# Patient Record
Sex: FEMALE | Race: WHITE | HISPANIC OR LATINO | Employment: FULL TIME | URBAN - METROPOLITAN AREA
[De-identification: names, ages, dates, MRNs, and addresses within clinical notes are randomized per-mention and may not be internally consistent; named-entity substitution may affect disease eponyms.]

---

## 2022-02-21 ENCOUNTER — TELEPHONE (OUTPATIENT)
Dept: OBGYN CLINIC | Facility: CLINIC | Age: 42
End: 2022-02-21

## 2022-02-21 NOTE — TELEPHONE ENCOUNTER
Attempted to contact patient in regards to her appointment that she has with Dr Brooklynn Sarmiento on 2/22 at 10:30  If patient is being seen for back pain patient needs to see Sports Med or Spine and pain  Dr Brooklynn Sarmiento does not see back pain  Unable to leave a message to confirm

## 2022-02-22 ENCOUNTER — OFFICE VISIT (OUTPATIENT)
Dept: OBGYN CLINIC | Facility: CLINIC | Age: 42
End: 2022-02-22
Payer: COMMERCIAL

## 2022-02-22 VITALS
WEIGHT: 198 LBS | BODY MASS INDEX: 33.8 KG/M2 | SYSTOLIC BLOOD PRESSURE: 112 MMHG | HEIGHT: 64 IN | DIASTOLIC BLOOD PRESSURE: 72 MMHG | HEART RATE: 97 BPM

## 2022-02-22 DIAGNOSIS — M54.16 LUMBAR RADICULOPATHY: Primary | ICD-10-CM

## 2022-02-22 PROCEDURE — 99203 OFFICE O/P NEW LOW 30 MIN: CPT | Performed by: ORTHOPAEDIC SURGERY

## 2022-02-22 RX ORDER — METHOCARBAMOL 750 MG/1
TABLET, FILM COATED ORAL
COMMUNITY
Start: 2022-02-01

## 2022-02-22 RX ORDER — KETOCONAZOLE 20 MG/ML
SHAMPOO TOPICAL
COMMUNITY
Start: 2021-12-08

## 2022-02-22 RX ORDER — HYDROXYZINE HYDROCHLORIDE 25 MG/1
TABLET, FILM COATED ORAL
COMMUNITY
Start: 2022-01-25

## 2022-02-22 RX ORDER — FLUOXETINE HYDROCHLORIDE 40 MG/1
40 CAPSULE ORAL DAILY
COMMUNITY

## 2022-02-22 RX ORDER — SUMATRIPTAN 50 MG/1
TABLET, FILM COATED ORAL
COMMUNITY
Start: 2021-12-06

## 2022-02-22 RX ORDER — ALBUTEROL SULFATE 90 UG/1
2 AEROSOL, METERED RESPIRATORY (INHALATION) EVERY 6 HOURS PRN
COMMUNITY

## 2022-02-22 RX ORDER — LORATADINE 10 MG/1
TABLET ORAL
COMMUNITY
Start: 2021-12-16

## 2022-02-22 RX ORDER — METFORMIN HYDROCHLORIDE 500 MG/1
TABLET, EXTENDED RELEASE ORAL
COMMUNITY
Start: 2022-02-08

## 2022-02-22 NOTE — LETTER
February 21, 2022     Patient: Ann Wagner   YOB: 1980   Date of Visit: 2/22/2022       Liseth if patient is coming for back pain she needs to see [facility] or Dr Napoleon Aly         Sincerely,        Lore Argueta MD    CC: No Recipients

## 2022-02-22 NOTE — PROGRESS NOTES
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  Patient tells me she was diagnosed with pelvic and the hip the stress fractures which to 2 years to heal pain is then she has had difficulty ambulating and the pain in the low back radiates to the right groin in the right foot  She has been treated at the [facility]   She presents for evaluation with the x-rays and MRIs of the spine lumbosacral spine      Current Medications  Current Outpatient Medications   Medication Sig Dispense Refill    albuterol (PROVENTIL HFA,VENTOLIN HFA) 90 mcg/act inhaler Inhale 2 puffs every 6 (six) hours as needed for wheezing      Diclofenac Sodium (VOLTAREN) 1 %       FLUoxetine (PROzac) 40 MG capsule Take 40 mg by mouth daily      fluticasone (FLOVENT DISKUS) 50 MCG/BLIST diskus inhaler Inhale 1 puff 2 (two) times a day Rinse mouth after use   HM LIDOCAINE PATCH EX Apply topically      hydrOXYzine HCL (ATARAX) 25 mg tablet       ketoconazole (NIZORAL) 2 % shampoo       loratadine (CLARITIN) 10 mg tablet       metFORMIN (GLUCOPHAGE-XR) 500 mg 24 hr tablet       methocarbamol (ROBAXIN) 750 mg tablet       SUMAtriptan (IMITREX) 50 mg tablet        No current facility-administered medications for this visit  Current Problems    Active Problems: There are no problems to display for this patient          Review of Systems:    General: negative for - chills, fatigue, fever,  weight gain or weight loss  Psychological: negative for - anxiety, behavioral disorder, concentration difficulties  Ophthalmic: negative for - blurry vision, decreased vision, double vision,      Past Medical History:   Past Medical History:   Diagnosis Date    Anxiety     Asthma     Diabetes mellitus ([address] )        Past Surgical History:   History reviewed  No pertinent surgical history  Family History:  Family history reviewed and non-contributory  No family history on file      Social History:  Social History     Socioeconomic History    Marital status: /Civil Union     Spouse name: None    Number of children: None    Years of education: None    Highest education level: None   Occupational History    None   Tobacco Use    Smoking status: Never Smoker    Smokeless tobacco: Never Used   Vaping Use    Vaping Use: Never used   Substance and Sexual Activity    Alcohol use: Yes     Comment: socially    Drug use: Never    Sexual activity: None   Other Topics Concern    None   Social History Narrative    None     Social Determinants of Health     Financial Resource Strain: Not on file   Food Insecurity: Not on file   Transportation Needs: Not on file   Physical Activity: Not on file   Stress: Not on file   Social Connections: Not on file   Intimate Partner Violence: Not on file   Housing Stability: Not on file       Allergies:   No Known Allergies        Physical ExaminationBP 112/72 (BP Location: Left arm, Patient Position: Sitting, Cuff Size: Standard)   Pulse 97   Ht 5' 4" (1 626 m)   Wt 89 8 kg (198 lb)   BMI 33 99 kg/m²   Gen: Alert and oriented to person, place, time  HEENT: EOMI, eyes clear, moist mucus membranes, hearing intact      Orthopedic Exam  Mild spasm of the lumbosacral spinal  Muscles especially paraspinal with mild tenderness straight leg raise is around 40° bilaterally negative stress test grade 5  Motor power in all muscle groups hip rotations in extensions completely pain-free but and flexion of the hip causes discomfort in the right groin  Reviewed MRI of the hip which is essentially normal done at the [facility] MRI of the lumbosacral spine shows multilevel DJD with possible left-sided disc prolapse          Impression  Back pain with the radiation to both lower limbs right more than left        Plan    Discussed treatment with the patient is aware of the importance of weight loss, regular exercise, stretching, over-the-counter  Pain medication and physical therapy patient referred  To physical therapy and pain management follow-up here benoit Delaney MD        Portions of the record may have been created with voice recognition software  Occasional wrong word or "sound a like" substitutions may have occurred due to the inherent limitations of voice recognition software  Read the chart carefully and recognize, using context, where substitutions have occurred

## 2022-03-04 ENCOUNTER — TELEPHONE (OUTPATIENT)
Dept: PAIN MEDICINE | Facility: CLINIC | Age: 42
End: 2022-03-04

## 2022-03-04 ENCOUNTER — OFFICE VISIT (OUTPATIENT)
Dept: PAIN MEDICINE | Facility: CLINIC | Age: 42
End: 2022-03-04
Payer: COMMERCIAL

## 2022-03-04 VITALS
HEART RATE: 88 BPM | HEIGHT: 64 IN | DIASTOLIC BLOOD PRESSURE: 74 MMHG | WEIGHT: 196.4 LBS | SYSTOLIC BLOOD PRESSURE: 118 MMHG | BODY MASS INDEX: 33.53 KG/M2

## 2022-03-04 DIAGNOSIS — M22.8X1 PATELLAR TRACKING DISORDER OF RIGHT KNEE: ICD-10-CM

## 2022-03-04 DIAGNOSIS — M46.1 SACROILIITIS (HCC): Primary | ICD-10-CM

## 2022-03-04 DIAGNOSIS — M51.36 LUMBAR DEGENERATIVE DISC DISEASE: ICD-10-CM

## 2022-03-04 DIAGNOSIS — M54.16 LUMBAR RADICULOPATHY: ICD-10-CM

## 2022-03-04 PROBLEM — M51.369 LUMBAR DEGENERATIVE DISC DISEASE: Status: ACTIVE | Noted: 2022-03-04

## 2022-03-04 PROCEDURE — 99204 OFFICE O/P NEW MOD 45 MIN: CPT | Performed by: ANESTHESIOLOGY

## 2022-03-04 NOTE — LETTER
March 4, 2022     Louie Cota MD  [address] [place] [id number]    Patient: Horacio Gimenez   YOB: 1980   Date of Visit: 3/4/2022       Dear Dr Carson Nielsen:    Thank you for referring Jerilyn Vigil to me for evaluation  Below are my notes for this consultation  If you have questions, please do not hesitate to call me  I look forward to following your patient along with you  Sincerely,        Valentin Lacy MD        CC: No Recipients  Valentin Lacy MD  3/4/2022  8:56 AM  Signed  Assessment:  1  Sacroiliitis ([address] )    2  Lumbar radiculopathy    3  Lumbar degenerative disc disease        Plan:  Patient is a 28-year-old female complaints of low back pain, bilateral hip pain, right knee pain, right ankle pain with chronic pain syndrome secondary to sacroiliitis presents office for initial consultation  MRI of lumbar spine was reviewed which showed degenerative disc changes at L5-S1 and L4-5 with foraminal narrowing on the left side  For physical exam and patient history appears that patient is suffer from sacroiliitis with exacerbation from sitting from standing position, standing from sitting position, leaning forward to pick objects off the table, sitting for any prolonged period of time  Patient denies ability to lie on her right side secondary to exacerbation of pain  Patient does report pain can radiate into the groin  Patient also show signs of 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  1  We will schedule patient for a bilateral sacroiliac joint steroid injection  2  We will discontinue Mobic and trial diclofenac 50 mg p o  B i d  For arthritic pain sacroiliac joint pain   3  Follow-up 1 month after injection    [place] Prescription Drug Monitoring Program report was reviewed and was appropriate     Complete risks and benefits including bleeding, infection, tissue reaction, nerve injury and allergic reaction were discussed   The approach was demonstrated using models and literature was provided  Verbal and written consent was obtained  History of Present Illness: The patient is a 43 y o  female who presents for consultation in regards to Hip Pain, Back Pain, Groin Pain, Knee Pain, and Foot Pain  Symptoms have been present for 2 years  Symptoms began without any precipitating injury or trauma  Pain is reported to be 10 on the numeric rating scale  Symptoms are felt constantly and worst in the nighttime  Symptoms are characterized as burning, sharp, numbing, tingling, pressure-like and throbbing  Symptoms are associated with right leg weakness  Aggravating factors include lying down, standing, bending, leaning forward, leaning bckward, sitting, walking and exercise  Relieving factors include relaxation  No change in symptoms with kneeling, coughing/sneezing and bowel movements  Treatments that have been helpful include TENS unit and heat/ice  physical therapy and chiropractic manipulation have provided no relief  Medications to relieve symptoms include Mobic, Lidoderm without relief  Review of Systems:    Review of Systems   Constitutional: Positive for unexpected weight change  Negative for fever  HENT: Negative for trouble swallowing  Eyes: Negative for visual disturbance  Respiratory: Negative for shortness of breath and wheezing  Cardiovascular: Positive for leg swelling  Negative for chest pain and palpitations  Gastrointestinal: Negative for constipation, diarrhea, nausea and vomiting  Endocrine: Positive for polydipsia  Negative for cold intolerance and heat intolerance  Genitourinary: Negative for difficulty urinating and frequency  Musculoskeletal: Positive for arthralgias, joint swelling and myalgias  Negative for gait problem  Skin: Negative for rash  Neurological: Negative for dizziness, seizures, syncope, weakness and headaches  Hematological: Does not bruise/bleed easily     Psychiatric/Behavioral: Negative for dysphoric mood  The patient is nervous/anxious  All other systems reviewed and are negative  Past Medical History:   Diagnosis Date    Anxiety     Asthma     Diabetes mellitus ([address] )        No past surgical history on file  No family history on file  Social History     Occupational History    Not on file   Tobacco Use    Smoking status: Never Smoker    Smokeless tobacco: Never Used   Vaping Use    Vaping Use: Never used   Substance and Sexual Activity    Alcohol use: Yes     Comment: socially    Drug use: Never    Sexual activity: Not on file         Current Outpatient Medications:     albuterol (PROVENTIL HFA,VENTOLIN HFA) 90 mcg/act inhaler, Inhale 2 puffs every 6 (six) hours as needed for wheezing, Disp: , Rfl:     Diclofenac Sodium (VOLTAREN) 1 %, , Disp: , Rfl:     FLUoxetine (PROzac) 40 MG capsule, Take 40 mg by mouth daily, Disp: , Rfl:     fluticasone (FLOVENT DISKUS) 50 MCG/BLIST diskus inhaler, Inhale 1 puff 2 (two) times a day Rinse mouth after use , Disp: , Rfl:     HM LIDOCAINE PATCH EX, Apply topically, Disp: , Rfl:     hydrOXYzine HCL (ATARAX) 25 mg tablet, , Disp: , Rfl:     ketoconazole (NIZORAL) 2 % shampoo, , Disp: , Rfl:     loratadine (CLARITIN) 10 mg tablet, , Disp: , Rfl:     metFORMIN (GLUCOPHAGE-XR) 500 mg 24 hr tablet, , Disp: , Rfl:     methocarbamol (ROBAXIN) 750 mg tablet, , Disp: , Rfl:     SUMAtriptan (IMITREX) 50 mg tablet, , Disp: , Rfl:     No Known Allergies    Physical Exam:    /74 (BP Location: Left arm, Patient Position: Sitting, Cuff Size: Standard)   Pulse 88   Ht 5' 4" (1 626 m)   Wt 89 1 kg (196 lb 6 4 oz)   BMI 33 71 kg/m²     Constitutional: normal, well developed, well nourished, alert, in no distress and non-toxic and no overt pain behavior   and obese  Eyes: anicteric  HEENT: grossly intact  Neck: supple, symmetric, trachea midline and no masses   Pulmonary:even and unlabored  Cardiovascular:No edema or pitting edema present  Skin:Normal without rashes or lesions and well hydrated  Psychiatric:Mood and affect appropriate  Neurologic:Cranial Nerves II-XII grossly intact  Musculoskeletal:normal, positive Camilo's, positive Gaenslen, positive tender to palpation sacroiliac joint bilaterally;     Imaging  No orders to display       No orders of the defined types were placed in this encounter

## 2022-03-04 NOTE — PROGRESS NOTES
Assessment:  1  Sacroiliitis ([facility] [address] )    2  Lumbar radiculopathy    3  Lumbar degenerative disc disease        Plan:  Patient is a 45-year-old female complaints of low back pain, bilateral hip pain, right knee pain, right ankle pain with chronic pain syndrome secondary to sacroiliitis presents office for initial consultation  MRI of lumbar spine was reviewed which showed degenerative disc changes at L5-S1 and L4-5 with foraminal narrowing on the left side  For physical exam and patient history appears that patient is suffer from sacroiliitis with exacerbation from sitting from standing position, standing from sitting position, leaning forward to pick objects off the table, sitting for any prolonged period of time  Patient denies ability to lie on her right side secondary to exacerbation of pain  Patient does report pain can radiate into the groin  Patient also show signs of 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  1  We will schedule patient for a bilateral sacroiliac joint steroid injection  2  We will discontinue Mobic and trial diclofenac 50 mg p o  B i d  For arthritic pain sacroiliac joint pain   3  Follow-up 1 month after injection    [facility] Prescription Drug Monitoring Program report was reviewed and was appropriate     Complete risks and benefits including bleeding, infection, tissue reaction, nerve injury and allergic reaction were discussed  The approach was demonstrated using models and literature was provided  Verbal and written consent was obtained  History of Present Illness: The patient is a 43 y o  female who presents for consultation in regards to Hip Pain, Back Pain, Groin Pain, Knee Pain, and Foot Pain  Symptoms have been present for 2 years  Symptoms began without any precipitating injury or trauma  Pain is reported to be 10 on the numeric rating scale  Symptoms are felt constantly and worst in the nighttime    Symptoms are characterized as burning, sharp, numbing, tingling, pressure-like and throbbing  Symptoms are associated with right leg weakness  Aggravating factors include lying down, standing, bending, leaning forward, leaning bckward, sitting, walking and exercise  Relieving factors include relaxation  No change in symptoms with kneeling, coughing/sneezing and bowel movements  Treatments that have been helpful include TENS unit and heat/ice  physical therapy and chiropractic manipulation have provided no relief  Medications to relieve symptoms include Mobic, Lidoderm without relief  Review of Systems:    Review of Systems   Constitutional: Positive for unexpected weight change  Negative for fever  HENT: Negative for trouble swallowing  Eyes: Negative for visual disturbance  Respiratory: Negative for shortness of breath and wheezing  Cardiovascular: Positive for leg swelling  Negative for chest pain and palpitations  Gastrointestinal: Negative for constipation, diarrhea, nausea and vomiting  Endocrine: Positive for polydipsia  Negative for cold intolerance and heat intolerance  Genitourinary: Negative for difficulty urinating and frequency  Musculoskeletal: Positive for arthralgias, joint swelling and myalgias  Negative for gait problem  Skin: Negative for rash  Neurological: Negative for dizziness, seizures, syncope, weakness and headaches  Hematological: Does not bruise/bleed easily  Psychiatric/Behavioral: Negative for dysphoric mood  The patient is nervous/anxious  All other systems reviewed and are negative  Past Medical History:   Diagnosis Date    Anxiety     Asthma     Diabetes mellitus ([facility] [address] )        No past surgical history on file  No family history on file      Social History     Occupational History    Not on file   Tobacco Use    Smoking status: Never Smoker    Smokeless tobacco: Never Used   Vaping Use    Vaping Use: Never used   Substance and Sexual Activity    Alcohol use: Yes     Comment: socially  Drug use: Never    Sexual activity: Not on file         Current Outpatient Medications:     albuterol (PROVENTIL HFA,VENTOLIN HFA) 90 mcg/act inhaler, Inhale 2 puffs every 6 (six) hours as needed for wheezing, Disp: , Rfl:     Diclofenac Sodium (VOLTAREN) 1 %, , Disp: , Rfl:     FLUoxetine (PROzac) 40 MG capsule, Take 40 mg by mouth daily, Disp: , Rfl:     fluticasone (FLOVENT DISKUS) 50 MCG/BLIST diskus inhaler, Inhale 1 puff 2 (two) times a day Rinse mouth after use , Disp: , Rfl:     HM LIDOCAINE PATCH EX, Apply topically, Disp: , Rfl:     hydrOXYzine HCL (ATARAX) 25 mg tablet, , Disp: , Rfl:     ketoconazole (NIZORAL) 2 % shampoo, , Disp: , Rfl:     loratadine (CLARITIN) 10 mg tablet, , Disp: , Rfl:     metFORMIN (GLUCOPHAGE-XR) 500 mg 24 hr tablet, , Disp: , Rfl:     methocarbamol (ROBAXIN) 750 mg tablet, , Disp: , Rfl:     SUMAtriptan (IMITREX) 50 mg tablet, , Disp: , Rfl:     No Known Allergies    Physical Exam:    /74 (BP Location: Left arm, Patient Position: Sitting, Cuff Size: Standard)   Pulse 88   Ht 5' 4" (1 626 m)   Wt 89 1 kg (196 lb 6 4 oz)   BMI 33 71 kg/m²     Constitutional: normal, well developed, well nourished, alert, in no distress and non-toxic and no overt pain behavior  and obese  Eyes: anicteric  HEENT: grossly intact  Neck: supple, symmetric, trachea midline and no masses   Pulmonary:even and unlabored  Cardiovascular:No edema or pitting edema present  Skin:Normal without rashes or lesions and well hydrated  Psychiatric:Mood and affect appropriate  Neurologic:Cranial Nerves II-XII grossly intact  Musculoskeletal:normal, positive Camilo's, positive Gaenslen, positive tender to palpation sacroiliac joint bilaterally;     Imaging  No orders to display       No orders of the defined types were placed in this encounter

## 2022-03-04 NOTE — PATIENT INSTRUCTIONS
Patellar Tracking Syndrome    Sacroiliac Joint Injection      What is the sacroiliac joint and why is a sacroiliac joint injection helpful? The sacroiliac joint is a large joint in the region of your low back and buttocks  When the joint becomes painful it can cause pain in its immediate region or it can refer pain into your groin, abdomen or leg  A sacroiliac joint injection serves several purposes  First, by placing numbing medicine into the joint, the amount of immediate pain relief you experience will help confirm or deny the joint as a source of your pain  Additionally, the temporary pain relief of the numbing medicine may better allow a physical therapist to treat the joint  Also, steroid will serve to reduce any presumed inflammation within your joint and further assist the physical therapist or chiropractor, if necessary  The procedure serves both diagnostic and therapeutic purposes  What happens during the procedure? Lying on your stomach, the skin on your buttocks will be well cleaned  The physician will numb a small area of skin which stings for a few seconds  Next, the physician will use X-ray guidance to direct a very small needle into the joint, and then he will inject several drops of contrast dye to confirm that the medicine goes into the joint  Then, a small amount of numbing medicine and anti-inflammatory steroid will be slowly injected  What happens after the procedure? A dressing may be applied to the injection site  You will remain in the office for about 15-20 minutes and the nurse will monitor your blood pressure and pulse  The nurse will review your discharge instructions and you will be able to go home  You may experience numbness or weakness to the affected limb for a few hours after the procedure  If this happens do not walk without assistance  Your physician may refer you to a physical therapist while the anti-inflammatory steroid is still working      General Pre/Post Instructions  Eating  You may eat a light, but not full meal at least one hour before the procedure, unless receiving intravenous sedation  If you are an insulin dependent diabetic do not alter your normal food intake  Medications  Take your routine medications before the procedure (such as high blood pressure and diabetes medications) except for those that need to be discontinued five days before the procedure such as aspirin and all anti-inflammatory medications (e g  Motrin/Ibuprofen, Aleve, Relafen, Daypro)  These medicines may be re-started the day after the procedure  You may take your regular pain medicine as needed before/after the procedure  If you are taking Coumadin, Heparin, Lovenox, Plavix or Ticlid you must notify the office so that the timing of stopping these medications can be explained  Exercise  You must bring a  with you  You may return to your current level of activities the next day including return to work  Things that may Delay the Procedure  If you are on antibiotics please notify our office; we may delay the procedure  If you have an active infection or fever we will not do the procedure

## 2022-03-04 NOTE — H&P (VIEW-ONLY)
Assessment:  1  Sacroiliitis ([facility] [address] )    2  Lumbar radiculopathy    3  Lumbar degenerative disc disease        Plan:  Patient is a 25-year-old female complaints of low back pain, bilateral hip pain, right knee pain, right ankle pain with chronic pain syndrome secondary to sacroiliitis presents office for initial consultation  MRI of lumbar spine was reviewed which showed degenerative disc changes at L5-S1 and L4-5 with foraminal narrowing on the left side  For physical exam and patient history appears that patient is suffer from sacroiliitis with exacerbation from sitting from standing position, standing from sitting position, leaning forward to pick objects off the table, sitting for any prolonged period of time  Patient denies ability to lie on her right side secondary to exacerbation of pain  Patient does report pain can radiate into the groin  Patient also show signs of 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  1  We will schedule patient for a bilateral sacroiliac joint steroid injection  2  We will discontinue Mobic and trial diclofenac 50 mg p o  B i d  For arthritic pain sacroiliac joint pain   3  Follow-up 1 month after injection    [place] Prescription Drug Monitoring Program report was reviewed and was appropriate     Complete risks and benefits including bleeding, infection, tissue reaction, nerve injury and allergic reaction were discussed  The approach was demonstrated using models and literature was provided  Verbal and written consent was obtained  History of Present Illness: The patient is a 43 y o  female who presents for consultation in regards to Hip Pain, Back Pain, Groin Pain, Knee Pain, and Foot Pain  Symptoms have been present for 2 years  Symptoms began without any precipitating injury or trauma  Pain is reported to be 10 on the numeric rating scale  Symptoms are felt constantly and worst in the nighttime    Symptoms are characterized as burning, sharp, numbing, tingling, pressure-like and throbbing  Symptoms are associated with right leg weakness  Aggravating factors include lying down, standing, bending, leaning forward, leaning bckward, sitting, walking and exercise  Relieving factors include relaxation  No change in symptoms with kneeling, coughing/sneezing and bowel movements  Treatments that have been helpful include TENS unit and heat/ice  physical therapy and chiropractic manipulation have provided no relief  Medications to relieve symptoms include Mobic, Lidoderm without relief  Review of Systems:    Review of Systems   Constitutional: Positive for unexpected weight change  Negative for fever  HENT: Negative for trouble swallowing  Eyes: Negative for visual disturbance  Respiratory: Negative for shortness of breath and wheezing  Cardiovascular: Positive for leg swelling  Negative for chest pain and palpitations  Gastrointestinal: Negative for constipation, diarrhea, nausea and vomiting  Endocrine: Positive for polydipsia  Negative for cold intolerance and heat intolerance  Genitourinary: Negative for difficulty urinating and frequency  Musculoskeletal: Positive for arthralgias, joint swelling and myalgias  Negative for gait problem  Skin: Negative for rash  Neurological: Negative for dizziness, seizures, syncope, weakness and headaches  Hematological: Does not bruise/bleed easily  Psychiatric/Behavioral: Negative for dysphoric mood  The patient is nervous/anxious  All other systems reviewed and are negative  Past Medical History:   Diagnosis Date    Anxiety     Asthma     Diabetes mellitus ([facility] [address] )        No past surgical history on file  No family history on file      Social History     Occupational History    Not on file   Tobacco Use    Smoking status: Never Smoker    Smokeless tobacco: Never Used   Vaping Use    Vaping Use: Never used   Substance and Sexual Activity    Alcohol use: Yes     Comment: socially  Drug use: Never    Sexual activity: Not on file         Current Outpatient Medications:     albuterol (PROVENTIL HFA,VENTOLIN HFA) 90 mcg/act inhaler, Inhale 2 puffs every 6 (six) hours as needed for wheezing, Disp: , Rfl:     Diclofenac Sodium (VOLTAREN) 1 %, , Disp: , Rfl:     FLUoxetine (PROzac) 40 MG capsule, Take 40 mg by mouth daily, Disp: , Rfl:     fluticasone (FLOVENT DISKUS) 50 MCG/BLIST diskus inhaler, Inhale 1 puff 2 (two) times a day Rinse mouth after use , Disp: , Rfl:     HM LIDOCAINE PATCH EX, Apply topically, Disp: , Rfl:     hydrOXYzine HCL (ATARAX) 25 mg tablet, , Disp: , Rfl:     ketoconazole (NIZORAL) 2 % shampoo, , Disp: , Rfl:     loratadine (CLARITIN) 10 mg tablet, , Disp: , Rfl:     metFORMIN (GLUCOPHAGE-XR) 500 mg 24 hr tablet, , Disp: , Rfl:     methocarbamol (ROBAXIN) 750 mg tablet, , Disp: , Rfl:     SUMAtriptan (IMITREX) 50 mg tablet, , Disp: , Rfl:     No Known Allergies    Physical Exam:    /74 (BP Location: Left arm, Patient Position: Sitting, Cuff Size: Standard)   Pulse 88   Ht 5' 4" (1 626 m)   Wt 89 1 kg (196 lb 6 4 oz)   BMI 33 71 kg/m²     Constitutional: normal, well developed, well nourished, alert, in no distress and non-toxic and no overt pain behavior  and obese  Eyes: anicteric  HEENT: grossly intact  Neck: supple, symmetric, trachea midline and no masses   Pulmonary:even and unlabored  Cardiovascular:No edema or pitting edema present  Skin:Normal without rashes or lesions and well hydrated  Psychiatric:Mood and affect appropriate  Neurologic:Cranial Nerves II-XII grossly intact  Musculoskeletal:normal, positive Camilo's, positive Gaenslen, positive tender to palpation sacroiliac joint bilaterally;     Imaging  No orders to display       No orders of the defined types were placed in this encounter

## 2022-03-25 ENCOUNTER — APPOINTMENT (OUTPATIENT)
Dept: RADIOLOGY | Facility: HOSPITAL | Age: 42
End: 2022-03-25
Payer: COMMERCIAL

## 2022-03-25 ENCOUNTER — HOSPITAL ENCOUNTER (OUTPATIENT)
Facility: HOSPITAL | Age: 42
Setting detail: OUTPATIENT SURGERY
Discharge: HOME | End: 2022-03-25
Attending: ANESTHESIOLOGY | Admitting: ANESTHESIOLOGY
Payer: COMMERCIAL

## 2022-03-25 VITALS
TEMPERATURE: 98.4 F | SYSTOLIC BLOOD PRESSURE: 101 MMHG | DIASTOLIC BLOOD PRESSURE: 69 MMHG | OXYGEN SATURATION: 97 % | HEART RATE: 69 BPM | RESPIRATION RATE: 18 BRPM

## 2022-03-25 DIAGNOSIS — M46.1 SACROILIITIS (HCC): ICD-10-CM

## 2022-03-25 PROCEDURE — 27096 INJECT SACROILIAC JOINT: CPT | Performed by: ANESTHESIOLOGY

## 2022-03-25 RX ORDER — METHYLPREDNISOLONE ACETATE 40 MG/ML
INJECTION, SUSPENSION INTRA-ARTICULAR; INTRALESIONAL; INTRAMUSCULAR; SOFT TISSUE AS NEEDED
Status: DISCONTINUED | OUTPATIENT
Start: 2022-03-25 | End: 2022-03-25 | Stop reason: HOSPADM

## 2022-03-25 RX ORDER — BUPIVACAINE HYDROCHLORIDE 2.5 MG/ML
INJECTION, SOLUTION EPIDURAL; INFILTRATION; INTRACAUDAL AS NEEDED
Status: DISCONTINUED | OUTPATIENT
Start: 2022-03-25 | End: 2022-03-25 | Stop reason: HOSPADM

## 2022-03-25 RX ORDER — LIDOCAINE HYDROCHLORIDE 10 MG/ML
INJECTION, SOLUTION EPIDURAL; INFILTRATION; INTRACAUDAL; PERINEURAL AS NEEDED
Status: DISCONTINUED | OUTPATIENT
Start: 2022-03-25 | End: 2022-03-25 | Stop reason: HOSPADM

## 2022-03-25 NOTE — DISCHARGE INSTRUCTIONS

## 2022-03-25 NOTE — INTERVAL H&P NOTE
H&P reviewed  After examining the patient I find no changes in the patients condition since the H&P had been written      Vitals:    03/25/22 0721   BP: 121/80   Pulse: 81   Resp: 18   Temp: 98 4 °F (36 9 °C)   SpO2: 97%

## 2022-03-25 NOTE — OP NOTE
OPERATIVE REPORT  PATIENT NAME: Ruthy Sotelo    :  1980  MRN: [id number]  Pt Location: [facility] OR ROOM 01    SURGERY DATE: 3/25/2022    Surgeon(s) and Role:     * Clarence Omer MD - Primary    Preop Diagnosis:  Sacroiliitis ([address] ) [M46 1]    Post-Op Diagnosis Codes:     * Sacroiliitis ([address] ) [M46 1]    Procedure(s) (LRB):  Bilateral sacroiliac joint steroid injection (Bilateral)    Specimen(s):  * No specimens in log *    Estimated Blood Loss:   Minimal    Drains:  * No LDAs found *    Anesthesia Type:   Local    Operative Indications:  Sacroiliitis ([address] ) [M46 1]      Operative Findings:  same    Complications:   None    Procedure and Technique:  Fluoroscopically-guided injection of the Bilateral sacroiliac joint(s)    After discussing the risks, benefits, and alternatives to the procedure, the patient expressed understanding and wished to proceed  The patient was brought to the fluoroscopy suite and placed in the prone position  Procedural pause conducted to verify:  correct patient identity, procedure to be performed and as applicable, correct side and site, correct patient position, and availability of implants, special equipment and special requirements  Using fluoroscopy, the inferior portion of the left sacroiliac joint was identified  The skin was sterilely prepped and draped in the usual fashion using Chloraprep skin prep  The skin and subcutaneous tissue were anesthetized with 0 5% lidocaine  Using fluoroscopic guidance, a 3 5 inch 22 gauge spinal needle was advanced into joint  Proper needle positioning was confirmed using multiple fluoroscopic views  After negative aspiration, 0 5 mL Omnipaque 300 contrast was injected, showing intraarticular spread of contrast without any evidence of intravascular uptake  A 2 5 mL solution consisting of 40 mg of Depo-Medrol in 0 25% bupivacaine was injected slowly and incrementally into the joint    Following the injection, the needle was withdrawn slightly and flushed with lidocaine as it was fully extracted  The procedure was repeated in the exact same way on the opposite side  The patient tolerated the procedure well and there were no apparent complications  After appropriate observation, the patient was dismissed from the clinic in good condition under their own power  COMMENTS  The patient received a total steroid dose of 80 mg     I was present for the entire procedure    Patient Disposition:  hemodynamically stable      SIGNATURE: Rain Evans MD  DATE: March 25, 2022  TIME: 8:55 AM

## 2022-03-30 ENCOUNTER — TELEPHONE (OUTPATIENT)
Dept: PAIN MEDICINE | Facility: CLINIC | Age: 42
End: 2022-03-30

## 2022-04-04 NOTE — TELEPHONE ENCOUNTER
Please see previous task.    S/W pt.  Pt stated she is feeling really tired and fatigue since her injection.  It has been staying the same since the injection.  Her period came 10 days early and she is always regular.  Pt asking if these things are related to the injection?  Please advise.

## 2022-04-04 NOTE — TELEPHONE ENCOUNTER
Pain level is a 6/10 as now  The injection worked about 60% as now. The pressure is not as bad as before.     Please have a nurse follow up the pt, she has a few questions.    -She would like to know if it is normal for her to feel tried after her injection?    -Is it possible her her cycle to change after having the procedure?

## 2022-04-05 NOTE — TELEPHONE ENCOUNTER
Left a detailed message as per release of health information, advising pt the same.  C/B # provided for any questions.

## 2022-05-03 ENCOUNTER — OFFICE VISIT (OUTPATIENT)
Dept: PAIN MEDICINE | Facility: CLINIC | Age: 42
End: 2022-05-03
Payer: COMMERCIAL

## 2022-05-03 VITALS
SYSTOLIC BLOOD PRESSURE: 119 MMHG | HEART RATE: 81 BPM | BODY MASS INDEX: 33.63 KG/M2 | HEIGHT: 64 IN | WEIGHT: 197 LBS | DIASTOLIC BLOOD PRESSURE: 75 MMHG

## 2022-05-03 DIAGNOSIS — M51.36 LUMBAR DEGENERATIVE DISC DISEASE: ICD-10-CM

## 2022-05-03 DIAGNOSIS — M46.1 SACROILIITIS (HCC): ICD-10-CM

## 2022-05-03 DIAGNOSIS — G89.29 CHRONIC BILATERAL LOW BACK PAIN WITHOUT SCIATICA: ICD-10-CM

## 2022-05-03 DIAGNOSIS — G89.4 CHRONIC PAIN SYNDROME: Primary | ICD-10-CM

## 2022-05-03 DIAGNOSIS — M54.50 CHRONIC BILATERAL LOW BACK PAIN WITHOUT SCIATICA: ICD-10-CM

## 2022-05-03 PROCEDURE — 99214 OFFICE O/P EST MOD 30 MIN: CPT | Performed by: NURSE PRACTITIONER

## 2022-05-03 NOTE — PROGRESS NOTES
Assessment:  1  Chronic pain syndrome    2  Chronic bilateral low back pain without sciatica    3  Sacroiliitis ([address] )    4  Lumbar degenerative disc disease        Plan:  While the patient was in the office today, I did have a thorough conversation regarding their chronic pain syndrome, medication management, and treatment plan options  Patient is being seen for follow-up visit  She recently underwent bilateral sacroiliac joint injections on 03/25/2022  She reports that her pain was up to 60% improved immediately after the injection  Currently, she reports that her pain is still better than it was before the injection in that it is no longer constant and not quite as severe  Start physical therapy for lumbar area  Patient tells me that she previously participated in physical therapy, but never for her lumbar area  Prescription for diclofenac 50 mg twice daily with food to address the inflammatory component of her pain  Prescription was sent to her pharmacy with a refill  Repeat SI joint injections p r n  The patient will follow-up in 6 weeks for medication prescription refill and reevaluation  The patient was advised to contact the office should their symptoms worsen in the interim  The patient was agreeable and verbalized an understanding  History of Present Illness: The patient is a 43 y o  female who presents for a follow up office visit in regards to Back Pain, Leg Pain, Knee Pain, and Foot Pain  The patients current symptoms include complaints of low back pain that can radiate to the groin, bilaterally  Current pain level is an 8/10  Quality pain is described as burning, sharp, throbbing, cramping, shooting  Current pain medications includes:  None   I have personally reviewed and/or updated the patient's past medical history, past surgical history, family history, social history, current medications, allergies, and vital signs today           Review of Systems  Review of Systems   Constitutional: Negative for chills and fever  HENT: Negative for ear pain and sore throat  Eyes: Negative for pain and visual disturbance  Respiratory: Negative for cough and shortness of breath  Cardiovascular: Negative for chest pain and palpitations  Gastrointestinal: Negative for abdominal pain and vomiting  Genitourinary: Negative for dysuria and hematuria  Musculoskeletal: Positive for gait problem and joint swelling (legs)  Negative for arthralgias and back pain  Decreased range of motion  Pain in extremity- groin area     Skin: Negative for color change and rash  Neurological: Negative for seizures and syncope  All other systems reviewed and are negative  Past Medical History:   Diagnosis Date    Anxiety     Asthma     Diabetes mellitus ([facility] [address] )        Past Surgical History:   Procedure Laterality Date   Jeanmarie Shorts JOINT Bilateral 3/25/2022    Procedure: Bilateral sacroiliac joint steroid injection;  Surgeon: Aide Nemwan MD;  Location: [facility] MAIN OR;  Service: Pain Management        History reviewed  No pertinent family history      Social History     Occupational History    Not on file   Tobacco Use    Smoking status: Never Smoker    Smokeless tobacco: Never Used   Vaping Use    Vaping Use: Never used   Substance and Sexual Activity    Alcohol use: Yes     Comment: socially    Drug use: Never    Sexual activity: Not on file         Current Outpatient Medications:     albuterol (PROVENTIL HFA,VENTOLIN HFA) 90 mcg/act inhaler, Inhale 2 puffs every 6 (six) hours as needed for wheezing, Disp: , Rfl:     Diclofenac Sodium (VOLTAREN) 1 %, , Disp: , Rfl:     FLUoxetine (PROzac) 40 MG capsule, Take 40 mg by mouth daily, Disp: , Rfl:     fluticasone (FLOVENT DISKUS) 50 MCG/BLIST diskus inhaler, Inhale 1 puff 2 (two) times a day Rinse mouth after use , Disp: , Rfl:     HM LIDOCAINE PATCH EX, Apply topically, Disp: , Rfl:     hydrOXYzine HCL (ATARAX) 25 mg tablet, , Disp: , Rfl:     ketoconazole (NIZORAL) 2 % shampoo, , Disp: , Rfl:     loratadine (CLARITIN) 10 mg tablet, , Disp: , Rfl:     metFORMIN (GLUCOPHAGE-XR) 500 mg 24 hr tablet, , Disp: , Rfl:     methocarbamol (ROBAXIN) 750 mg tablet, , Disp: , Rfl:     SUMAtriptan (IMITREX) 50 mg tablet, , Disp: , Rfl:     diclofenac sodium (VOLTAREN) 50 mg EC tablet, Take 1 tablet (50 mg total) by mouth 2 (two) times a day With food, Disp: 60 tablet, Rfl: 1    No Known Allergies    Physical Exam:    /75   Pulse 81   Ht 5' 4" (1 626 m)   Wt 89 4 kg (197 lb)   BMI 33 81 kg/m²     Constitutional:normal, well developed, well nourished, alert, in no distress and non-toxic and no overt pain behavior  Eyes:anicteric  HEENT:grossly intact  Neck:supple, symmetric, trachea midline and no masses   Pulmonary:even and unlabored  Cardiovascular:No edema or pitting edema present  Skin:Normal without rashes or lesions and well hydrated  Psychiatric:Mood and affect appropriate  Neurologic:Cranial Nerves II-XII grossly intact  Musculoskeletal:Tenderness over bilateral sacroiliac joints  Compression test, Chapo's maneuver, Gaenslen's test are positive bilaterally      Imaging  No orders to display       Orders Placed This Encounter   Procedures    Ambulatory Referral to Physical Therapy

## 2022-06-14 ENCOUNTER — OFFICE VISIT (OUTPATIENT)
Dept: PAIN MEDICINE | Facility: CLINIC | Age: 42
End: 2022-06-14
Payer: COMMERCIAL

## 2022-06-14 VITALS
HEIGHT: 64 IN | WEIGHT: 196 LBS | BODY MASS INDEX: 33.46 KG/M2 | DIASTOLIC BLOOD PRESSURE: 75 MMHG | SYSTOLIC BLOOD PRESSURE: 115 MMHG | HEART RATE: 85 BPM

## 2022-06-14 DIAGNOSIS — G89.4 CHRONIC PAIN SYNDROME: ICD-10-CM

## 2022-06-14 DIAGNOSIS — G89.29 CHRONIC BILATERAL LOW BACK PAIN WITHOUT SCIATICA: ICD-10-CM

## 2022-06-14 DIAGNOSIS — M46.1 SACROILIITIS (HCC): ICD-10-CM

## 2022-06-14 DIAGNOSIS — M51.36 LUMBAR DEGENERATIVE DISC DISEASE: ICD-10-CM

## 2022-06-14 DIAGNOSIS — M54.50 CHRONIC BILATERAL LOW BACK PAIN WITHOUT SCIATICA: ICD-10-CM

## 2022-06-14 PROCEDURE — 99214 OFFICE O/P EST MOD 30 MIN: CPT | Performed by: NURSE PRACTITIONER

## 2022-06-14 NOTE — PATIENT INSTRUCTIONS
Core Strengthening Exercises   AMBULATORY CARE:   What you need to know about core strengthening exercises: Your core includes the muscles of your lower back, hip, pelvis, and abdomen  Core strengthening exercises help heal and strengthen these muscles  This helps prevent another injury, and keeps your pelvis, spine, and hips in the correct position  Contact your healthcare provider if:   You have sharp or worsening pain during exercise or at rest     You have questions or concerns about your shoulder exercises  Safety tips:  Talk to your healthcare provider before you start an exercise program  A physical therapist can teach you how to do core strengthening exercises safely  Do the exercises on a mat or firm surface  A firm surface will support your spine and prevent low back pain  Do not do these exercises on a bed  Move slowly and smoothly  Avoid fast or jerky motions  Stop if you feel pain  Core exercises should not be painful  Stop if you feel pain  Breathe normally during core exercises  Do not hold your breath  This may cause an increase in blood pressure and prevent muscle strengthening  Your healthcare provider will tell you when to inhale and exhale during the exercise  Begin all of your exercises with abdominal bracing  Abdominal bracing helps warm up your core muscles  You can also practice abdominal bracing throughout the day  Lie on your back with your knees bent and feet flat on the floor  Place your arms in a relaxed position beside your body  Tighten your abdominal muscles  Pull your belly button in and up toward your spine  Hold for 5 seconds  Relax your muscles  Repeat 10 times  Core strengthening exercises: Your healthcare provider will tell you how often to do these exercises  The provider will also tell you how many repetitions of each exercise you should do  Hold each exercise for 5 seconds or as directed   As you get stronger, increase your hold to 10 to 15 seconds  You can do some of these exercises on a stability ball, or with a weight  Ask your healthcare provider how to use a stability ball or weight for these exercises:  Bridging:  Lie on your back with your knees bent and feet flat on the floor  Rest your arms at your side  Tighten your buttocks, and then lift your hips 1 inch off the floor  Hold for 5 seconds  When you can do this exercise without pain for 10 seconds, increase the distance you lift your hips  A good goal is to be able to lift your hips so that your shoulders, hips, and knees are in a straight line  Dead bug:  Lie on your back with your knees bent and feet flat on the floor  Place your arms in a relaxed position beside your body  Begin with abdominal bracing  Next, raise one leg, keeping your knee bent  Hold for 5 seconds  Repeat with the other leg  When you can do this exercise without pain for 10 to 15 seconds, you may raise one straight leg and hold  Repeat with the other leg  Quadruped:  Place your hands and knees on the floor  Keep your wrists directly below your shoulders and your knees directly below your hips  Pull your belly button in toward your spine  Do not flatten or arch your back  Tighten your abdominal muscles below your belly button  Hold for 5 seconds  When you can do this exercise without pain for 10 to 15 seconds, you may extend one arm and hold  Repeat on the other side  Side bridge exercises:      Standing side bridge:  Stand next to a wall and extend one arm toward the wall  Place your palm flat on the wall with your fingers pointing upward  Begin with abdominal bracing  Next, without moving your feet, slowly bend your arm to 90 degrees  Hold for 5 seconds  Repeat on the other side  When you can do this exercise without pain for 10 to 15 seconds, you may do the bent leg side bridge on the floor  Bent leg side bridge:  Lie on one side with your legs, hips, and shoulders in a straight line  Prop yourself up onto your forearm so your elbow is directly below your shoulder  Bend your knees back to 90 degrees  Begin with abdominal bracing  Next, lift your hips and balance yourself on your forearm and knees  Hold for 5 seconds  Repeat on the other side  When you can do this exercise without pain for 10 to 15 seconds, you may do the straight leg side bridge on the floor  Straight leg side bridge:  Lie on one side with your legs, hips, and shoulders in a straight line  Prop yourself up onto your forearm so your elbow is directly below your shoulder  Begin with abdominal bracing  Lift your hips off the floor and balance yourself on your forearm and the outside of your flexed foot  Do not let your ankle bend sideways  Hold for 5 seconds  Repeat on the other side  When you can do this exercise without pain for 10 to 15 seconds, ask your healthcare provider for more advanced exercises  Superman:  Lie on your stomach  Extend your arms forward on the floor  Tighten your abdominal muscles and lift your right hand and left leg off the floor  Hold this position  Slowly return to the starting position  Tighten your abdominal muscles and lift your left hand and right leg off the floor  Hold this position  Slowly return to the starting position  Clam:  Lie on your side with your knees bent  Put your bottom arm under your head to keep your neck in line  Put your top hand on your hip to keep your pelvis from moving  Put your heels together, and keep them together during this exercise  Slowly raise your top knee toward the ceiling  Then lower your leg so your knees are together  Repeat this exercise 10 times  Then switch sides and do the exercise 10 times with the other leg  Curl up:  Lie on your back with your knees bent and feet flat on the floor  Place your hands, palms down, underneath your lower back   Next, with your elbows on the floor, lift your shoulders and chest 2 to 3 inches off the floor  Keep your head in line with your shoulders  Hold this position  Slowly return to the starting position  Straight leg raises:  Lie on your back with one leg straight  Bend the other knee and place your foot flat on the floor  Tighten your abdominal muscles  Keep your leg straight and slowly lift it straight up 6 to 12 inches off the floor  Hold this position  Lower your leg slowly  Do as many repetitions as directed on this side  Repeat with the other leg  Plank:  Lie on your stomach  Bend your elbows and place your forearms flat on the floor  Lift your chest, stomach, and knees off the floor  Make sure your elbows are below your shoulders  Your body should be in a straight line  Do not let your hips or lower back sink to the ground  Squeeze your abdominal muscles together and hold for 15 seconds  To make this exercise harder, hold for 30 seconds or lift 1 leg at a time  Bicycles:  Lie on your back  Bend both knees and bring them toward your chest  Your calves should be parallel to the floor  Place the palms of your hands on the back of your head  Straighten your right leg and keep it lifted 2 inches off the floor  Raise your head and shoulders off the floor and twist towards your left  Keep your head and shoulders lifted  Bend your right knee while you straighten your left leg  Keep your left leg 2 inches off the floor  Twist your head and chest towards the left leg  Continue to straighten 1 leg at a time and twist        Follow up with your doctor as directed:  Write down your questions so you remember to ask them during your visits  © Copyright [employer] 2022 Information is for End User's use only and may not be sold, redistributed or otherwise used for commercial purposes  All illustrations and images included in CareNotes® are the copyrighted property of [employer]  or Spencer Wetzel   The above information is an  only   It is not intended as medical advice for individual conditions or treatments  Talk to your doctor, nurse or pharmacist before following any medical regimen to see if it is safe and effective for you

## 2022-06-14 NOTE — PROGRESS NOTES
Assessment:  1  Chronic pain syndrome    2  Chronic bilateral low back pain without sciatica    3  Sacroiliitis ([address] )    4  Lumbar degenerative disc disease        Plan:  While the patient was in the office today, I did have a thorough conversation regarding their chronic pain syndrome, medication management, and treatment plan options  Patient is being seen for follow-up visit  She was last seen here on 05/03/2022 at which time it was recommended that she start physical therapy  Patient was unable to start physical therapy due to a very high out of pocket co-pay  During today's visit I provided her with lumbar core strengthening/stretching exercises  I also provided her with sacroiliac joint strengthening exercises that she can do at home  Patient underwent a sacroiliac joint injection in March and states that her pain is still not as severe as it was before the injection  She is able to sit longer with less pain  Repeat SI joint injection p r n         Continue diclofenac 50 mg twice daily to address the inflammatory component of her pain  Prescription was sent to her pharmacy  The patient will follow-up in 6 weeks for medication prescription refill and reevaluation  The patient was advised to contact the office should their symptoms worsen in the interim  The patient was agreeable and verbalized an understanding  History of Present Illness: The patient is a 43 y o  female who presents for a follow up office visit in regards to Back Pain, Leg Pain, Knee Pain, and Hip Pain  The patients current symptoms include complaints of low back pain that radiates to both hips and bilateral buttocks  Current pain level is 7/10  Quality pain is described as sharp, cramping, pressure-like  Current pain medications includes:  Diclofenac 50 mg twice daily   The patient reports that this regimen is providing 25-30 % pain relief    The patient is reporting no side effects from this pain medication regimen  I have personally reviewed and/or updated the patient's past medical history, past surgical history, family history, social history, current medications, allergies, and vital signs today  Review of Systems  Review of Systems   Constitutional: Negative for chills and fever  HENT: Negative for ear pain and sore throat  Eyes: Negative for pain and visual disturbance  Respiratory: Negative for cough and shortness of breath  Cardiovascular: Negative for chest pain and palpitations  Gastrointestinal: Negative for abdominal pain and vomiting  Genitourinary: Negative for dysuria and hematuria  Musculoskeletal: Positive for gait problem  Negative for arthralgias and back pain  Decreased range of motion     Skin: Negative for color change and rash  Neurological: Negative for seizures and syncope  All other systems reviewed and are negative  Past Medical History:   Diagnosis Date    Anxiety     Asthma     Diabetes mellitus ([facility] [address] )        Past Surgical History:   Procedure Laterality Date   Olivia [place] JOINT Bilateral 3/25/2022    Procedure: Bilateral sacroiliac joint steroid injection;  Surgeon: Tom Martinez MD;  Location: [facility];  Service: Pain Management        History reviewed  No pertinent family history      Social History     Occupational History    Not on file   Tobacco Use    Smoking status: Never Smoker    Smokeless tobacco: Never Used   Vaping Use    Vaping Use: Never used   Substance and Sexual Activity    Alcohol use: Yes     Comment: socially    Drug use: Never    Sexual activity: Not on file         Current Outpatient Medications:     albuterol (PROVENTIL HFA,VENTOLIN HFA) 90 mcg/act inhaler, Inhale 2 puffs every 6 (six) hours as needed for wheezing, Disp: , Rfl:     Diclofenac Sodium (VOLTAREN) 1 %, , Disp: , Rfl:     diclofenac sodium (VOLTAREN) 50 mg EC tablet, Take 1 tablet (50 mg total) by mouth 2 (two) times a day With food, Disp: 60 tablet, Rfl: 1    FLUoxetine (PROzac) 40 MG capsule, Take 40 mg by mouth daily, Disp: , Rfl:     fluticasone (FLOVENT DISKUS) 50 MCG/BLIST diskus inhaler, Inhale 1 puff 2 (two) times a day Rinse mouth after use , Disp: , Rfl:     HM LIDOCAINE PATCH EX, Apply topically, Disp: , Rfl:     hydrOXYzine HCL (ATARAX) 25 mg tablet, , Disp: , Rfl:     ketoconazole (NIZORAL) 2 % shampoo, , Disp: , Rfl:     loratadine (CLARITIN) 10 mg tablet, , Disp: , Rfl:     metFORMIN (GLUCOPHAGE-XR) 500 mg 24 hr tablet, , Disp: , Rfl:     methocarbamol (ROBAXIN) 750 mg tablet, , Disp: , Rfl:     SUMAtriptan (IMITREX) 50 mg tablet, , Disp: , Rfl:     No Known Allergies    Physical Exam:    /75   Pulse 85   Ht 5' 4" (1 626 m)   Wt 88 9 kg (196 lb)   BMI 33 64 kg/m²     Constitutional:normal, well developed, well nourished, alert, in no distress and non-toxic and no overt pain behavior  Eyes:anicteric  HEENT:grossly intact  Neck:supple, symmetric, trachea midline and no masses   Pulmonary:even and unlabored  Cardiovascular:No edema or pitting edema present  Skin:Normal without rashes or lesions and well hydrated  Psychiatric:Mood and affect appropriate  Neurologic:Cranial Nerves II-XII grossly intact  Musculoskeletal:There is tenderness over bilateral SI joints  Imaging  No orders to display       No orders of the defined types were placed in this encounter

## 2022-08-01 ENCOUNTER — OFFICE VISIT (OUTPATIENT)
Dept: PAIN MEDICINE | Facility: CLINIC | Age: 42
End: 2022-08-01
Payer: COMMERCIAL

## 2022-08-01 VITALS
DIASTOLIC BLOOD PRESSURE: 74 MMHG | WEIGHT: 198 LBS | BODY MASS INDEX: 33.8 KG/M2 | HEART RATE: 81 BPM | HEIGHT: 64 IN | SYSTOLIC BLOOD PRESSURE: 116 MMHG

## 2022-08-01 DIAGNOSIS — M54.50 CHRONIC BILATERAL LOW BACK PAIN WITHOUT SCIATICA: ICD-10-CM

## 2022-08-01 DIAGNOSIS — G89.4 CHRONIC PAIN SYNDROME: Primary | ICD-10-CM

## 2022-08-01 DIAGNOSIS — M46.1 SACROILIITIS (HCC): ICD-10-CM

## 2022-08-01 DIAGNOSIS — G89.29 CHRONIC BILATERAL LOW BACK PAIN WITHOUT SCIATICA: ICD-10-CM

## 2022-08-01 DIAGNOSIS — M51.36 LUMBAR DEGENERATIVE DISC DISEASE: ICD-10-CM

## 2022-08-01 PROCEDURE — 99214 OFFICE O/P EST MOD 30 MIN: CPT | Performed by: NURSE PRACTITIONER

## 2022-08-01 NOTE — PROGRESS NOTES
Assessment:  1  Chronic pain syndrome    2  Chronic bilateral low back pain without sciatica    3  Lumbar degenerative disc disease    4  Sacroiliitis ([address] )        Plan:  While the patient was in the office today, I did have a thorough conversation regarding their chronic pain syndrome, medication management, and treatment plan options  Patient is being seen for follow-up visit  Overall, she reports that her low back pain remains very well controlled  She underwent sacroiliac joint injections in March were helpful  She was last seen here on 06/14/2022 which time I provided her with lumbar core strengthening/stretching exercises to do at home as well as exercises for the sacroiliac joint  Patient states that the exercises have been very helpful    She continues to use diclofenac 50 mg twice daily on an as-needed basis  Repeat SI joint injections as needed  The patient will follow-up in 3 month for medication prescription refill and reevaluation  The patient was advised to contact the office should their symptoms worsen in the interim  The patient was agreeable and verbalized an understanding  History of Present Illness: The patient is a 43 y o  female who presents for a follow up office visit in regards to Back Pain, Knee Pain, and Foot Pain  The patients current symptoms include complaints of low back pain, right knee pain  Current pain level is a 4/10  Quality pain is described as cramping, pressure-like  Current pain medications includes:  Diclofenac 50 mg as needed    The patient reports that this regimen is providing 50 % pain relief  The patient is reporting no side effects from this pain medication regimen  I have personally reviewed and/or updated the patient's past medical history, past surgical history, family history, social history, current medications, allergies, and vital signs today           Review of Systems  Review of Systems   Constitutional: Negative for fatigue and unexpected weight change  HENT: Negative for dental problem, ear pain, hearing loss and sneezing  Eyes: Negative for visual disturbance  Respiratory: Negative for cough and chest tightness  Cardiovascular: Negative for leg swelling  Gastrointestinal: Negative for anal bleeding  Endocrine: Negative for heat intolerance  Genitourinary: Negative for flank pain and genital sores  Musculoskeletal: Positive for gait problem  Decreased range of motion   Skin: Negative for wound  Allergic/Immunologic: Negative for immunocompromised state  Neurological: Negative for speech difficulty and light-headedness  Hematological: Negative for adenopathy  Psychiatric/Behavioral: Negative for confusion  The patient is not hyperactive  All other systems reviewed and are negative  Past Medical History:   Diagnosis Date    Anxiety     Asthma     Diabetes mellitus ([facility] [address] )        Past Surgical History:   Procedure Laterality Date   Alvin [place] JOINT Bilateral 3/25/2022    Procedure: Bilateral sacroiliac joint steroid injection;  Surgeon: Richie García MD;  Location: [facility];  Service: Pain Management        History reviewed  No pertinent family history      Social History     Occupational History    Not on file   Tobacco Use    Smoking status: Never Smoker    Smokeless tobacco: Never Used   Vaping Use    Vaping Use: Never used   Substance and Sexual Activity    Alcohol use: Yes     Comment: socially    Drug use: Never    Sexual activity: Not on file         Current Outpatient Medications:     albuterol (PROVENTIL HFA,VENTOLIN HFA) 90 mcg/act inhaler, Inhale 2 puffs every 6 (six) hours as needed for wheezing, Disp: , Rfl:     Diclofenac Sodium (VOLTAREN) 1 %, , Disp: , Rfl:     diclofenac sodium (VOLTAREN) 50 mg EC tablet, Take 1 tablet (50 mg total) by mouth 2 (two) times a day With food, Disp: 60 tablet, Rfl: 1    FLUoxetine (PROzac) 40 MG capsule, Take 40 mg by mouth daily, Disp: , Rfl:     fluticasone (FLOVENT DISKUS) 50 MCG/BLIST diskus inhaler, Inhale 1 puff 2 (two) times a day Rinse mouth after use , Disp: , Rfl:     HM LIDOCAINE PATCH EX, Apply topically, Disp: , Rfl:     hydrOXYzine HCL (ATARAX) 25 mg tablet, , Disp: , Rfl:     ketoconazole (NIZORAL) 2 % shampoo, , Disp: , Rfl:     loratadine (CLARITIN) 10 mg tablet, , Disp: , Rfl:     metFORMIN (GLUCOPHAGE-XR) 500 mg 24 hr tablet, , Disp: , Rfl:     methocarbamol (ROBAXIN) 750 mg tablet, , Disp: , Rfl:     SUMAtriptan (IMITREX) 50 mg tablet, , Disp: , Rfl:     No Known Allergies    Physical Exam:    /74   Pulse 81   Ht 5' 4" (1 626 m)   Wt 89 8 kg (198 lb)   BMI 33 99 kg/m²     Constitutional:normal, well developed, well nourished, alert, in no distress and non-toxic and no overt pain behavior  Eyes:anicteric  HEENT:grossly intact  Neck:supple, symmetric, trachea midline and no masses   Pulmonary:even and unlabored  Cardiovascular:No edema or pitting edema present  Skin:Normal without rashes or lesions and well hydrated  Psychiatric:Mood and affect appropriate  Neurologic:Cranial Nerves II-XII grossly intact  Musculoskeletal:normal    Imaging  No orders to display       No orders of the defined types were placed in this encounter

## 2022-09-08 ENCOUNTER — OFFICE VISIT (OUTPATIENT)
Dept: PAIN MEDICINE | Facility: CLINIC | Age: 42
End: 2022-09-08
Payer: COMMERCIAL

## 2022-09-08 VITALS
HEART RATE: 84 BPM | HEIGHT: 64 IN | BODY MASS INDEX: 33.8 KG/M2 | DIASTOLIC BLOOD PRESSURE: 72 MMHG | WEIGHT: 198 LBS | SYSTOLIC BLOOD PRESSURE: 110 MMHG

## 2022-09-08 DIAGNOSIS — M51.36 LUMBAR DEGENERATIVE DISC DISEASE: ICD-10-CM

## 2022-09-08 DIAGNOSIS — G89.29 CHRONIC BILATERAL LOW BACK PAIN WITHOUT SCIATICA: ICD-10-CM

## 2022-09-08 DIAGNOSIS — M46.1 SACROILIITIS (HCC): ICD-10-CM

## 2022-09-08 DIAGNOSIS — M54.50 CHRONIC BILATERAL LOW BACK PAIN WITHOUT SCIATICA: ICD-10-CM

## 2022-09-08 DIAGNOSIS — G89.4 CHRONIC PAIN SYNDROME: Primary | ICD-10-CM

## 2022-09-08 PROCEDURE — 99214 OFFICE O/P EST MOD 30 MIN: CPT | Performed by: NURSE PRACTITIONER

## 2022-09-08 NOTE — PROGRESS NOTES
Assessment:  1  Chronic pain syndrome    2  Chronic bilateral low back pain without sciatica    3  Lumbar degenerative disc disease    4  Sacroiliitis ([address] )        Plan:  While the patient was in the office today, I did have a thorough conversation regarding their chronic pain syndrome, medication management, and treatment plan options  Patient is being seen for follow-up visit  She is complaining of increasing low back pain that radiates to her groin  Pain intermittently radiates down her legs  On exam, she demonstrates findings consistent with sacroiliac mediated pain  Sacroiliac joint injections were helpful in the past   At this point, we will plan to repeat bilateral SI joint injections in the near future  Complete risks and benefits including bleeding, infection, tissue reaction, nerve injury and allergic reaction were discussed  The approach was demonstrated using models and literature was provided  Verbal and written consent was obtained  Continue diclofenac 50 mg twice daily if needed for pain  She did not require refill this medication during today's visit  Follow-up 1 month after the injection  History of Present Illness: The patient is a 43 y o  female who presents for a follow up office visit in regards to Leg Pain, Back Pain, and Foot Pain (Follow-up)  The patients current symptoms include complaints of low back pain that radiates to her groin, bilaterally  Pain can radiate down her legs at times  Current pain level is an 8/10  Quality pain is described as burning, sharp, cramping, pressure-like  Current pain medications includes:  Diclofenac 50 mg twice daily if needed for pain   The patient reports that this regimen is providing 30 % pain relief  The patient is reporting no side effects from this pain medication regimen      I have personally reviewed and/or updated the patient's past medical history, past surgical history, family history, social history, current medications, allergies, and vital signs today  Review of Systems  Review of Systems   Constitutional: Negative for chills and fever  HENT: Negative for ear pain and sore throat  Eyes: Negative for pain and visual disturbance  Respiratory: Negative for cough and shortness of breath  Cardiovascular: Negative for chest pain and palpitations  Gastrointestinal: Positive for constipation  Negative for abdominal pain and vomiting  Genitourinary: Negative for dysuria and hematuria  Musculoskeletal: Positive for gait problem  Negative for arthralgias and back pain  Decreased ROM, pain in feet    Skin: Negative for color change and rash  Neurological: Negative for seizures and syncope  All other systems reviewed and are negative  Past Medical History:   Diagnosis Date    Anxiety     Asthma     Diabetes mellitus ([facility] [address] )        Past Surgical History:   Procedure Laterality Date   Beverely Lissette JOINT Bilateral 3/25/2022    Procedure: Bilateral sacroiliac joint steroid injection;  Surgeon: Valentin Lacy MD;  Location: [facility];  Service: Pain Management        History reviewed  No pertinent family history      Social History     Occupational History    Not on file   Tobacco Use    Smoking status: Never Smoker    Smokeless tobacco: Never Used   Vaping Use    Vaping Use: Never used   Substance and Sexual Activity    Alcohol use: Yes     Comment: socially    Drug use: Never    Sexual activity: Not on file         Current Outpatient Medications:     albuterol (PROVENTIL HFA,VENTOLIN HFA) 90 mcg/act inhaler, Inhale 2 puffs every 6 (six) hours as needed for wheezing, Disp: , Rfl:     Diclofenac Sodium (VOLTAREN) 1 %, , Disp: , Rfl:     diclofenac sodium (VOLTAREN) 50 mg EC tablet, Take 1 tablet (50 mg total) by mouth 2 (two) times a day With food, Disp: 60 tablet, Rfl: 1    FLUoxetine (PROzac) 40 MG capsule, Take 40 mg by mouth daily, Disp: , Rfl:    fluticasone (FLOVENT DISKUS) 50 MCG/BLIST diskus inhaler, Inhale 1 puff 2 (two) times a day Rinse mouth after use , Disp: , Rfl:     HM LIDOCAINE PATCH EX, Apply topically, Disp: , Rfl:     hydrOXYzine HCL (ATARAX) 25 mg tablet, , Disp: , Rfl:     ketoconazole (NIZORAL) 2 % shampoo, , Disp: , Rfl:     loratadine (CLARITIN) 10 mg tablet, , Disp: , Rfl:     methocarbamol (ROBAXIN) 750 mg tablet, , Disp: , Rfl:     SUMAtriptan (IMITREX) 50 mg tablet, , Disp: , Rfl:     metFORMIN (GLUCOPHAGE-XR) 500 mg 24 hr tablet, , Disp: , Rfl:     No Known Allergies    Physical Exam:    /72 (BP Location: Left arm, Patient Position: Sitting, Cuff Size: Large)   Pulse 84   Ht 5' 4" (1 626 m)   Wt 89 8 kg (198 lb)   BMI 33 99 kg/m²     Constitutional:normal, well developed, well nourished, alert, in no distress and non-toxic and no overt pain behavior  Eyes:anicteric  HEENT:grossly intact  Neck:supple, symmetric, trachea midline and no masses   Pulmonary:even and unlabored  Cardiovascular:No edema or pitting edema present  Skin:Normal without rashes or lesions and well hydrated  Psychiatric:Mood and affect appropriate  Neurologic:Cranial Nerves II-XII grossly intact  Musculoskeletal:Tenderness over bilateral SI joints  compression test, Chapo's maneuver,Gaenslen's test are positive bilaterally  Imaging  No orders to display       No orders of the defined types were placed in this encounter

## 2024-09-05 DIAGNOSIS — Z00.6 ENCOUNTER FOR EXAMINATION FOR NORMAL COMPARISON OR CONTROL IN CLINICAL RESEARCH PROGRAM: ICD-10-CM

## (undated) DEVICE — NEEDLE 18 G X 1 1/2

## (undated) DEVICE — FLEXIBLE ADHESIVE BANDAGE,X-LARGE: Brand: CURITY

## (undated) DEVICE — GLOVE SRG BIOGEL 8

## (undated) DEVICE — NEEDLE 25G X 1 1/2

## (undated) DEVICE — BANDAID SHEER SPOT

## (undated) DEVICE — SYRINGE 3ML LL

## (undated) DEVICE — CHLORAPREP HI-LITE 10.5ML ORANGE

## (undated) DEVICE — SYRINGE 5ML LL

## (undated) DEVICE — NEEDLE SPINAL 22G X 3.5IN  QUINCKE